# Patient Record
Sex: MALE | Race: OTHER
[De-identification: names, ages, dates, MRNs, and addresses within clinical notes are randomized per-mention and may not be internally consistent; named-entity substitution may affect disease eponyms.]

---

## 2019-04-22 ENCOUNTER — HOSPITAL ENCOUNTER (EMERGENCY)
Dept: HOSPITAL 54 - ER | Age: 19
Discharge: HOME | End: 2019-04-22
Payer: SELF-PAY

## 2019-04-22 VITALS — DIASTOLIC BLOOD PRESSURE: 61 MMHG | SYSTOLIC BLOOD PRESSURE: 110 MMHG

## 2019-04-22 VITALS — WEIGHT: 177 LBS | BODY MASS INDEX: 25.34 KG/M2 | HEIGHT: 70 IN
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
## 2019-04-22 DIAGNOSIS — F10.129: Primary | ICD-10-CM

## 2019-04-22 NOTE — NUR
Pt is ambulatory with steady gait to restroom. No acute s/s of distress noted 
at this time. He is able to recall events drinking at a bar prior to arriving 
at the ED